# Patient Record
Sex: MALE | Race: WHITE | URBAN - METROPOLITAN AREA
[De-identification: names, ages, dates, MRNs, and addresses within clinical notes are randomized per-mention and may not be internally consistent; named-entity substitution may affect disease eponyms.]

---

## 2018-01-16 ENCOUNTER — THERAPY VISIT (OUTPATIENT)
Dept: CHIROPRACTIC MEDICINE | Facility: CLINIC | Age: 63
End: 2018-01-16
Payer: COMMERCIAL

## 2018-01-16 DIAGNOSIS — M79.10 MYALGIA: ICD-10-CM

## 2018-01-16 DIAGNOSIS — M99.05 SOMATIC DYSFUNCTION OF PELVIS REGION: Primary | ICD-10-CM

## 2018-01-16 DIAGNOSIS — M25.552 HIP PAIN, LEFT: ICD-10-CM

## 2018-01-16 DIAGNOSIS — S76.312S LEFT HAMSTRING MUSCLE STRAIN, SEQUELA: ICD-10-CM

## 2018-01-16 DIAGNOSIS — M76.899 HAMSTRING TENDONITIS: ICD-10-CM

## 2018-01-16 PROCEDURE — 99202 OFFICE O/P NEW SF 15 MIN: CPT | Performed by: CHIROPRACTOR

## 2018-01-16 NOTE — MR AVS SNAPSHOT
"              After Visit Summary   2018    Edwin Byrd    MRN: [id number]           Patient Information     Date Of Birth          1955        Visit Information        Provider Department      2018 3:15 PM Dickson Johnson DC [facility]        Today's Diagnoses     Somatic dysfunction of pelvis region    -  1    Left hamstring muscle strain, sequela        Hip pain, left        Myalgia        Hamstring tendonitis           Follow-ups after your visit        Who to contact     If you have questions or need follow up information about today's clinic visit or your schedule please contact [facility] directly at [phone].  Normal or non-critical lab and imaging results will be communicated to you by GotVoicehart, letter or phone within 4 business days after the clinic has received the results. If you do not hear from us within 7 days, please contact the clinic through GotVoicehart or phone. If you have a critical or abnormal lab result, we will notify you by phone as soon as possible.  Submit refill requests through MixVille or call your pharmacy and they will forward the refill request to us. Please allow 3 business days for your refill to be completed.          Additional Information About Your Visit        MyChart Information     MixVille lets you send messages to your doctor, view your test results, renew your prescriptions, schedule appointments and more. To sign up, go to [email or web address] . Click on \"Log in\" on the left side of the screen, which will take you to the Welcome page. Then click on \"Sign up Now\" on the right side of the page.     You will be asked to enter the access code listed below, as well as some personal information. Please follow the directions to create your username and password.     Your access code is: [email or web address]  Expires: 2018  9:27 PM     Your access code will  in " 90 days. If you need help or a new code, please call your [place] clinic or [phone].        Care EveryWhere ID     This is your Care EveryWhere ID. This could be used by other organizations to access your [facility] medical records  [id number]         Blood Pressure from Last 3 Encounters:   No data found for BP    Weight from Last 3 Encounters:   No data found for Wt              We Performed the Following     OFFICE/OUTPT VISIT,CLAUDE PORRAS II        Primary Care Provider Fax #    Physician No Ref-Primary [phone]       No address on file        Equal Access to Services     [facility]: Hadii aad ku hadasho Soomaali, waaxda luqadaha, qaybta kaalmada adeegyarashad, marlene deleon . So [facility] [phone].    ATENCIÓN: Si habla español, tiene a tello disposición servicios gratuitos de asistencia lingüística. Llame al [phone].    We comply with applicable federal civil rights laws and [place] laws. We do not discriminate on the basis of race, color, national origin, age, disability, sex, sexual orientation, or gender identity.            Thank you!     Thank you for choosing [facility]  for your care. Our goal is always to provide you with excellent care. Hearing back from our patients is one way we can continue to improve our services. Please take a few minutes to complete the written survey that you may receive in the mail after your visit with us. Thank you!             Your Updated Medication List - Protect others around you: Learn how to safely use, store and throw away your medicines at www.disposemymeds.org.      Notice  As of 1/16/2018  9:27 PM    You have not been prescribed any medications.

## 2018-01-16 NOTE — PROGRESS NOTES
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   ROS: high blood pressure    Objective:  Inspection:  No SDD  No Scars  Normal Gait    Palpation:  No specific pain  Palpable soreness over L mid and general hamstring  Myofascitis 2/4 noted over L hip ER's, L hamstring    ROM:  Lumbar flexion   90/90, tightness left hamstring  Lumbar extension  30/30, lower back discomfort   Right Hip IR  11/45  Left Hip IR  0/45  Right Hip ER  43/60  Left  hip ER  40/60  SLR symmetric with mild discomfort noted over left mid hamstrng    MMT:  Left glute med 4/5 with no pain  Right glute med 4/5 with no pain  Left TFL 5/5 with no pain  Right TFL 5/5 with no pain  Left piriformis 4/5 with no pain  Right piriformis 4/5 with no pain  Hamstrings 4/5 on L with mid hamstring discomfort     MET:  L short    Squat:  Shift to L    NAL:  Restricted SI on  L    Neuro:  Able to toe walk and heel walk  L4-S1 light touch WNL    Ortho:  SLR (tightness noted)    Assessment:  NAL with associated myofascitis and weakness  Hamstring strain in past    Plan:   Decrease pain 50%    Restore symmetric hip IR   Restore symmetric hip ER   Strengthen hip stabilizers to 5/5 B   Restore pelvic leveling   Restore segmental motion   Functional goals in history    Patient was given detailed history, review of symptoms, examination, functional examination, and report of findings. After this patient was treated with chiropractic adjustments, manual therapy, and therapeutic exercise. Patient tolerated treatment well. Patients treatment plan with be 2 times per week for 2 weeks followed by 1 time per week for 2 weeks. Following treatment plan a follow up exam will be done to make sure patient is improving. Treatment frequency will degrease as patients subjective complaints improve as well as objective findings. Prognosis for care is good based on fact that patient is active and is willing to take active approach in care.     Patient tolerated treatment well today  Treatment Time: 45 minutes  62030 manipulation 1-2 segments: MET, Hip LAD  04218 Manual therapy: (ART, Graston, Strain Counter Strain, Fascial Manipulation, Cupping) performed over area of L hamstring, L adductors, L hip ER's  25694 therapeutic exercise (30 minutes): reverse clam, bottom leg hip rotation, only one at home is standing single leg RDL 2X15  Strapping: hip IR on L  Next visit: 1 week  Demonstration of dry needling for next session      Dickson Johnson DC, MEd, ATC

## 2018-01-22 ENCOUNTER — THERAPY VISIT (OUTPATIENT)
Dept: CHIROPRACTIC MEDICINE | Facility: CLINIC | Age: 63
End: 2018-01-22
Payer: COMMERCIAL

## 2018-01-22 DIAGNOSIS — M79.10 MYALGIA: ICD-10-CM

## 2018-01-22 DIAGNOSIS — S76.312S LEFT HAMSTRING MUSCLE STRAIN, SEQUELA: ICD-10-CM

## 2018-01-22 DIAGNOSIS — M99.05 SOMATIC DYSFUNCTION OF PELVIS REGION: Primary | ICD-10-CM

## 2018-01-22 DIAGNOSIS — M25.552 HIP PAIN, LEFT: ICD-10-CM

## 2018-01-22 DIAGNOSIS — M76.899 HAMSTRING TENDONITIS: ICD-10-CM

## 2018-01-22 PROCEDURE — 97110 THERAPEUTIC EXERCISES: CPT | Performed by: CHIROPRACTOR

## 2018-01-22 PROCEDURE — 98940 CHIROPRACT MANJ 1-2 REGIONS: CPT | Mod: AT | Performed by: CHIROPRACTOR

## 2018-01-22 NOTE — PROGRESS NOTES
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 Was not sore from last session. Did note was sick though and vomited. No other symptoms besides slight fever. Notes he is fine by next morning. Was not sore at all. Denies any new issues. Felt like treatment is beneficial.     Objective:  Inspection:  No SDD  No Scars  Normal Gait    Palpation:  No specific pain  Palpable soreness over L mid and general hamstring  Myofascitis 2/4 noted over L hip ER's, L hamstring    ROM:  Lumbar flexion   90/90, tightness left hamstring  Lumbar extension  30/30, lower back discomfort   Right Hip IR  11/45  Left Hip IR  0/45  Right Hip ER  43/60  Left  hip ER  40/60  SLR symmetric with mild discomfort noted over left mid hamstrng    MMT:  Left glute med 4/5 with no pain  Right glute med 4/5 with  no pain  Left TFL 5/5 with no pain  Right TFL 5/5 with no pain  Left piriformis 4/5 with no pain  Right piriformis 4/5 with no pain  Hamstrings 4/5 on L with mid hamstring discomfort     MET:  L short    Squat:  Shift to L    NAL:  Restricted SI on L    Ortho:  SLR (tightness noted)    Assessment:  NAL with associated myofascitis and weakness  Hamstring strain in past    Plan:  Patient tolerated treatment well today  Treatment Time: 45 minutes  80062 manipulation 1-2 segments: MET, Hip LAD  91057 Manual therapy: (ART, Graston, Strain Counter Strain, Fascial Manipulation, Cupping) performed over area of L hamstring, L adductors, L hip ER's  48445 therapeutic exercise (30 minutes): reverse clam, bottom leg hip rotation, only one at home is standing single leg RDL 2X15  Strapping: hip IR on L  Next visit: 1 week  Dry needle L mid calf      Dickson Johnson DC, MEd, ATC

## 2018-01-22 NOTE — MR AVS SNAPSHOT
"              After Visit Summary   2018    Edwin Byrd    MRN: [id number]           Patient Information     Date Of Birth          1955        Visit Information        Provider Department      2018 5:45 PM Dickson Johnson DC [facility]        Today's Diagnoses     Somatic dysfunction of pelvis region    -  1    Left hamstring muscle strain, sequela        Hip pain, left        Myalgia        Hamstring tendonitis           Follow-ups after your visit        Who to contact     If you have questions or need follow up information about today's clinic visit or your schedule please contact [facility] ATH[facility]IC [facility] CH[facility]OPRA[facility] directly at [phone].  Normal or non-critical lab and imaging results will be communicated to you by [employer] Pharm[employer]art, letter or phone within 4 business days after the clinic has received the results. If you do not hear from us within 7 days, please contact the clinic through [employer] or phone. If you have a critical or abnormal lab result, we will notify you by phone as soon as possible.  Submit refill requests through Modumetal or call your pharmacy and they will forward the refill request to us. Please allow 3 business days for your refill to be completed.          Additional Information About Your Visit        MyChart Information     Modumetal lets you send messages to your doctor, view your test results, renew your prescriptions, schedule appointments and more. To sign up, go to [email or web address] . Click on \"Log in\" on the left side of the screen, which will take you to the Welcome page. Then click on \"Sign up Now\" on the right side of the page.     You will be asked to enter the access code listed below, as well as some personal information. Please follow the directions to create your username and password.     Your access code is: [email or web address]  Expires: 2018  9:27 PM     Your access code will  in " 90 days. If you need help or a new code, please call your [facility] clinic or [phone].        Care EveryWhere ID     This is your Care EveryWhere ID. This could be used by other organizations to access your [facility] medical records  [id number]         Blood Pressure from Last 3 Encounters:   No data found for BP    Weight from Last 3 Encounters:   No data found for Wt              We Performed the Following     CHIROPRAC MANIP,SPINAL,1-2 REGIONS     THERAPEUTIC EXERCISES        Primary Care Provider Fax #    Physician No Ref-Primary [phone]       No address on file        Equal Access to Services     [facility]: Hadii aad ku hadasho Soomaali, waaxda luqadaha, qaybta kaalmada adeegyarashad, marlene deleon . So [facility] [phone].    ATENCIÓN: Si habla español, tiene a tello disposición servicios gratuitos de asistencia lingüística. Ll[facility] [phone].    We comply with applicable federal civil rights laws and [place] laws. We do not discriminate on the basis of race, color, national origin, age, disability, sex, sexual orientation, or gender identity.            Thank you!     Thank you for choosing [facility]  for your care. Our goal is always to provide you with excellent care. Hearing back from our patients is one way we can continue to improve our services. Please take a few minutes to complete the written survey that you may receive in the mail after your visit with us. Thank you!             Your Updated Medication List - Protect others around you: Learn how to safely use, store and throw away your medicines at www.disposemymeds.org.      Notice  As of 1/22/2018  6:51 PM    You have not been prescribed any medications.

## 2018-02-01 ENCOUNTER — THERAPY VISIT (OUTPATIENT)
Dept: CHIROPRACTIC MEDICINE | Facility: CLINIC | Age: 63
End: 2018-02-01
Payer: COMMERCIAL

## 2018-02-01 DIAGNOSIS — M79.10 MYALGIA: ICD-10-CM

## 2018-02-01 DIAGNOSIS — S76.312S LEFT HAMSTRING MUSCLE STRAIN, SEQUELA: ICD-10-CM

## 2018-02-01 DIAGNOSIS — M25.552 HIP PAIN, LEFT: ICD-10-CM

## 2018-02-01 DIAGNOSIS — M76.899 HAMSTRING TENDONITIS: ICD-10-CM

## 2018-02-01 DIAGNOSIS — M99.05 SOMATIC DYSFUNCTION OF PELVIS REGION: Primary | ICD-10-CM

## 2018-02-01 PROCEDURE — 97110 THERAPEUTIC EXERCISES: CPT | Performed by: CHIROPRACTOR

## 2018-02-01 PROCEDURE — 98940 CHIROPRACT MANJ 1-2 REGIONS: CPT | Performed by: CHIROPRACTOR

## 2018-02-01 NOTE — PROGRESS NOTES
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 Was not sore from last session and was not sick. He has worked on active care program. Notes improvement. Happy with progress. Denies any new issues.     Objective:  Inspection:  No SDD  No Scars  Normal Gait    Palpation:  No specific pain  Palpable soreness over L mid and general hamstring  Myofascitis 2/4 noted over L hip ER's, L hamstring    ROM:  Lumbar flexion   90/90, tightness left hamstring  Lumbar extension  30/30, lower back discomfort   Right Hip IR  11/45  Left Hip IR  0/45  Right Hip ER  43/60  Left  hip ER  40/60  SLR symmetric with mild discomfort noted over left mid hamstrng    MMT:  Left glute med 4/5 with no pain  Right glute med 4/5 with no pain  Left TFL 5/5 with no pain  Right TFL 5/5 with no pain  Left  piriformis 4/5 with no pain  Right piriformis 4/5 with no pain  Hamstrings 4/5 on L with mid hamstring discomfort     MET:  L short    Squat:  Shift to L    NAL:  Restricted SI on L    Ortho:  SLR (tightness noted)    Assessment:  NAL with associated myofascitis and weakness  Hamstring strain in past    Plan:  Patient tolerated treatment well today  Treatment Time: 45 minutes  57674 manipulation 1-2 segments: MET, Hip LAD  76865 Manual therapy: (ART, Graston, Strain Counter Strain, Fascial Manipulation, Cupping) performed over area of L hamstring, L adductors, L hip ER's  69863 therapeutic exercise (30 minutes): reverse clam, bottom leg hip rotation, only one at home is standing single leg RDL 2X15, green strap hamstring PIR, blue band leg press   Strapping: hip IR on L  Next visit: 1 week  Dry needle L mid calf (6) tolerated well      Dickson Johnson DC, MEd, ATC

## 2018-02-07 ENCOUNTER — THERAPY VISIT (OUTPATIENT)
Dept: CHIROPRACTIC MEDICINE | Facility: CLINIC | Age: 63
End: 2018-02-07
Payer: COMMERCIAL

## 2018-02-07 DIAGNOSIS — M79.10 MYALGIA: ICD-10-CM

## 2018-02-07 DIAGNOSIS — M76.899 HAMSTRING TENDONITIS: ICD-10-CM

## 2018-02-07 DIAGNOSIS — M25.552 HIP PAIN, LEFT: ICD-10-CM

## 2018-02-07 DIAGNOSIS — S76.312S LEFT HAMSTRING MUSCLE STRAIN, SEQUELA: ICD-10-CM

## 2018-02-07 DIAGNOSIS — M99.05 SOMATIC DYSFUNCTION OF PELVIS REGION: Primary | ICD-10-CM

## 2018-02-07 PROCEDURE — 98940 CHIROPRACT MANJ 1-2 REGIONS: CPT | Mod: AT | Performed by: CHIROPRACTOR

## 2018-02-07 PROCEDURE — 97110 THERAPEUTIC EXERCISES: CPT | Performed by: CHIROPRACTOR

## 2018-02-10 NOTE — PROGRESS NOTES
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 Was not sore from last session and was not sick. He notes he is doing better and will try to skate this weekend and see how it goes. He is doing active care and denies any new issues. Pleased with progress.     Objective:  Inspection:  No SDD  No Scars  Normal Gait    Palpation:  No specific pain  Palpable soreness over L mid and general hamstring  Myofascitis 2/4 noted over L hip ER's, L hamstring    ROM:  Lumbar flexion   90/90, tightness left hamstring  Lumbar extension  30/30, lower back discomfort   Right Hip IR  11/45  Left Hip IR  0/45  Right Hip ER  43/60  Left  hip ER  40/60  SLR symmetric with mild discomfort noted over left mid hamstrng    MMT:  Left glute med 4/5 with no pain  Right glute med 4/5 with no pain  Left  TFL 5/5 with no pain  Right TFL 5/5 with no pain  Left piriformis 4/5 with no pain  Right piriformis 4/5 with no pain  Hamstrings 4/5 on L with mid hamstring discomfort     MET:  L short    Squat:  Shift to L    NAL:  Restricted SI on L    Ortho:  SLR (tightness noted)    Assessment:  NAL with associated myofascitis and weakness  Hamstring strain in past    Plan:  Patient tolerated treatment well today  Treatment Time: 45 minutes  99015 manipulation 1-2 segments: MET, Hip LAD  53344 Manual therapy: (ART, Graston, Strain Counter Strain, Fascial Manipulation, Cupping) performed over area of L hamstring, L adductors, L hip ER's  80402 therapeutic exercise (30 minutes): reverse clam, bottom leg hip rotation, only one at home is standing single leg RDL 2X15, green strap hamstring PIR, blue band leg press, double leg hamstring curls on stability ball,   Strapping: hip IR on L  Next visit: 2 week  Dry needle L mid calf (6) tolerated well      Dickson Johnson DC, MEd, ATC